# Patient Record
Sex: MALE | Race: WHITE
[De-identification: names, ages, dates, MRNs, and addresses within clinical notes are randomized per-mention and may not be internally consistent; named-entity substitution may affect disease eponyms.]

---

## 2018-08-24 ENCOUNTER — HOSPITAL ENCOUNTER (EMERGENCY)
Dept: HOSPITAL 56 - MW.ED | Age: 58
Discharge: HOME | End: 2018-08-24
Payer: COMMERCIAL

## 2018-08-24 DIAGNOSIS — S89.92XA: Primary | ICD-10-CM
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
## 2018-08-24 NOTE — CR
EXAM DATE: 18



PATIENT'S AGE: 58



Patient: PATRICE WESTBROOK



Facility: [place], [place]

Patient ID: [id number]

Site Patient ID: [id number].

Site Accession #: [id number].

: 1960

Study: XRay Knee Left [id number]/[id number] 6:19:58 PM

Ordering Physician: Doctor Temp



Final Report: 

Indication:

Injury 



Technique:

Three views of the left knee



Comparison:

None available



Findings:

Bones: A small curvilinear calcific density adjacent to the medial tibial spine 
on the frontal view, of unclear chronicity and possibly projectional. No 
dislocation. 

Joint spaces: Preserved. Small patellar osteophytes. A possible small 
suprapatellar effusion. Possible mild edema in Hoffa`s fat pad. 

Soft tissues: Unremarkable. 



Impression:

A small calcific density adjacent to the medial tibial spine may be 
projectional or chronic, however a small acute avulsion fracture is not 
excluded. Correlate clinically and if indicated, consider followup imaging 
evaluation.



Dictated by Aurelio Freed MD @ 2018 6:56:00 PM



Dictated by: Aurelio Freed MD @ 2018 18:56:14

(Electronic Signature)



Report Signed by Proxy.
KHADRA

## 2018-08-24 NOTE — EDM.PDOC
ED HPI GENERAL MEDICAL PROBLEM





- General


Chief Complaint: Lower Extremity Injury/Pain


Stated Complaint: PT HURT LT KNEE


Time Seen by Provider: 08/24/18 18:00


Source of Information: Reports: Patient


History Limitations: Reports: No Limitations





- History of Present Illness


INITIAL COMMENTS - FREE TEXT/NARRATIVE: 





HISTORY AND PHYSICAL:





History of present illness:


Patient is a 58-year-old male here with complaint of left knee pain. States 

that around noon today he was standing and when to go away with someone and 

moved to the left. He states when he did this he felt a pop on the inside of 

his left knee. He states his knee did not give out on him but he felt immediate 

pain. He has been able to walk on it just with some discomfort. He has not 

taken anything for the pain.





Review of systems: 


As per history of present illness and below otherwise all systems reviewed and 

negative.





Past medical history: 


As per history of present illness and as reviewed below otherwise 

noncontributory.





Surgical history: 


As per history of present illness and as reviewed below otherwise 

noncontributory.





Social history: 


No reported history of drug or alcohol abuse.





Family history: 


As per history of present illness and as reviewed below otherwise 

noncontributory.





Physical exam:


General: Patient sitting comfortably in no acute distress and nontoxic appearing


HEENT: Atraumatic, normocephalic, pupils reactive, negative for conjunctival 

pallor or scleral icterus, mucous membranes moist, throat clear, neck supple, 

nontender, trachea midline.


Lungs: Clear to auscultation, breath sounds equal bilaterally, chest nontender.


Heart: S1S2, regular, negative for clicks, rubs, or JVD.


Abdomen: Soft, nondistended, nontender. Negative for masses or 

hepatosplenomegaly. Negative for costovertebral tenderness.


Pelvis: Stable nontender.


Genitourinary: Deferred.


Rectal: Deferred.


Extremities: There is no obvious swelling or deformity of the left knee. 

Patient has pain to palpation of the medial joint line. No effusion noted. 

Negative anterior, posterior drawer. Negative varus and valgus stress test. 

Atraumatic, negative for cords or calf pain. Neurovascular unremarkable.


Neuro: Awake, alert, oriented. Cranial nerves II through XII unremarkable. 

Cerebellum unremarkable. Motor and sensory unremarkable throughout. Exam 

nonfocal.





Notes: There is a small calcific density adjacent to the medial tibial spine 

that may suggest a small acute avulsion fracture. 





Diagnostics:


X-ray left knee





Therapeutics:


Knee immobilizer





Prescriptions


Diclofenac 





Impression: 


Left knee injury 





Plan:


1. Ice, elevate, and take diclofenac as directed. You may take tylenol if 

needed but no additional NSAIDs. 


2. Follow up with orthopedic provider


3. Return to ED as needed as discussed 





Definitive disposition and diagnosis as appropriate pending reevaluation and 

review of above.





  ** Left Knee


Pain Score (Numeric/FACES): 7





- Related Data


 Allergies











Allergy/AdvReac Type Severity Reaction Status Date / Time


 


No Known Allergies Allergy   Verified 04/04/14 16:55











Home Meds: 


 Home Meds





Aspirin [Shana Chewable] 81 mg PO DAILY 04/04/14 [History]


Calcium Carbonate [Tums] mg PO PRN 04/04/14 [History]


Diclofenac Sodium [Voltaren] 75 mg PO BIDMEALS #20 tab.cr 08/24/18 [Rx]











Past Medical History





- Past Health History


Medical/Surgical History: Denies Medical/Surgical History





- Infectious Disease History


Infectious Disease History: Reports: None





- Past Surgical History


GI Surgical History: Reports: Hernia, Inguinal





Social & Family History





- Family History


Family Medical History: Noncontributory





- Tobacco Use


Smoking Status *Q: Never Smoker


Second Hand Smoke Exposure: No





- Recreational Drug Use


Recreational Drug Use: No





Review of Systems





- Review of Systems


Review Of Systems: ROS reveals no pertinent complaints other than HPI.





ED EXAM, GENERAL





- Physical Exam


Exam: See Below (see dictation)





Course





- Vital Signs


Last Recorded V/S: 


 Last Vital Signs











Temp  36.9 C   08/24/18 17:42


 


Pulse  74   08/24/18 17:42


 


Resp  18   08/24/18 17:42


 


BP  128/84   08/24/18 17:42


 


Pulse Ox  98   08/24/18 17:42














- Orders/Labs/Meds


Orders: 


 Active Orders 24 hr











 Category Date Time Status


 


 Knee 3V Lt [CR] Stat Exams  08/24/18 17:56 Taken














Departure





- Departure


Time of Disposition: 19:15


Disposition: Home, Self-Care 01


Condition: Good


Clinical Impression: 


 Left knee injury








- Discharge Information


Prescriptions: 


Diclofenac Sodium [Voltaren] 75 mg PO BIDMEALS #20 tab.cr


Referrals: 


PCP,None [Primary Care Provider] - 


Forms:  ED Department Discharge


Additional Instructions: 


The following information is given to patients seen in the emergency department 

who are being discharged to home. This information is to outline your options 

for follow-up care. We provide all patients seen in our emergency department 

with a follow-up referral.





The need for follow-up, as well as the timing and circumstances, are variable 

depending upon the specifics of your emergency department visit.





If you don't have a primary care physician on staff, we will provide you with a 

referral. We always advise you to contact your personal physician following an 

emergency department visit to inform them of the circumstance of the visit and 

for follow-up with them and/or the need for any referrals to a consulting 

specialist.





The emergency department will also refer you to a specialist when appropriate. 

This referral assures that you have the opportunity for follow-up care with a 

specialist. All of these measure are taken in an effort to provide you with 

optimal care, which includes your follow-up.





Under all circumstances we always encourage you to contact your private 

physician who remains a resource for coordinating your care. When calling for 

follow-up care, please make the office aware that this follow-up is from your 

recent emergency room visit. If for any reason you are refused follow-up, 

please contact the [facility] Emergency 

Department at [phone] and asked to speak to the emergency department 

charge nurse.





[facility]


Specialty Care - Orthopedic Clinic


[address], [address]


[place] [address]


Phone: [phone]


Fax: [phone]





1. Ice, elevate, and take diclofenac as directed. You may take tylenol if 

needed but no additional NSAIDs. 


2. Follow up with orthopedic provider


3. Return to ED as needed as discussed 





- My Orders


Last 24 Hours: 


My Active Orders





08/24/18 17:56


Knee 3V Lt [CR] Stat 














- Assessment/Plan


Last 24 Hours: 


My Active Orders





08/24/18 17:56


Knee 3V Lt [CR] Stat

## 2021-02-12 NOTE — EDM.PDOC
ED HPI GENERAL MEDICAL PROBLEM





- General


Chief Complaint: Skin Complaint


Stated Complaint: LEFT ELBOW PAIN


Time Seen by Provider: 02/12/21 20:34





- History of Present Illness


INITIAL COMMENTS - FREE TEXT/NARRATIVE: 


60-year-old male does not see doctors but no known known past medical history 

presenting with left elbow warmth and redness that started 1 day ago he works as

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  

No fevers no chills no pain in the wrist or shoulder.


  ** left elbow


Pain Score (Numeric/FACES): 5





- Related Data


                                    Allergies











Allergy/AdvReac Type Severity Reaction Status Date / Time


 


No Known Allergies Allergy   Verified 02/12/21 20:36











Home Meds: 


                                    Home Meds





Amoxicillin 875 mg PO BID 10 Days #20 tablet 02/12/21 [Rx]


Ibuprofen [Motrin] 600 mg PO Q8H 5 Days #15 tab 02/12/21 [Rx]


Sulfamethoxazole/Trimethoprim [Bactrim Ds Tablet] 1 each PO BID 10 Days #20 

tablet 02/12/21 [Rx]











Past Medical History





- Past Health History


Medical/Surgical History: Denies Medical/Surgical History


HEENT History: Reports: None


Cardiovascular History: Reports: None


Respiratory History: Reports: None


Gastrointestinal History: Reports: None


Genitourinary History: Reports: None


Musculoskeletal History: Reports: None


Neurological History: Reports: None


Psychiatric History: Reports: None


Endocrine/Metabolic History: Reports: None


Insulin Pump Model and : None


Hematologic History: Reports: None


Immunologic History: Reports: None


Oncologic (Cancer) History: Reports: None


Dermatologic History: Reports: None





- Infectious Disease History


Infectious Disease History: Reports: None





- Past Surgical History


GI Surgical History: Reports: Hernia, Inguinal





Social & Family History





- Family History


Family Medical History: No Pertinent Family History





- Caffeine Use


Caffeine Use: Reports: Coffee





- Recreational Drug Use


Recreational Drug Use: No





ED ROS GENERAL





- Review of Systems


Review Of Systems: See Below


Free Text/Narrative/Comment: 


General: No fever.


Skin: Per HPI


Musculoskeletal: Per HPI


Neurologic: No headache.





ED EXAM, SKIN/RASH


Exam: See Below


Text/Narrative:: 


General Appearance: No acute distress, appears comfortable


Skin: No rash


HEENT: Normocephalic/atraumatic, sclera anicteric, mucous membranes moist


Neck: Normal range of motion


Musculoskeletal: There is a tender warm and faintly erythematous swelling over 

the left olecranon bursa there is no severe overlying cellulitis there is no 

lymphangitic spread there is no limitation in range of motion of the left elbow 

the left hand is neurovascularly intact.  He has no tenderness within the elbow 

joint itself.  Strength is intact in the left upper extremity


Neurologic: Awake, alert, no obvious deficits, moving all extremities


Psychiatric: Appropriate, cooperative





Course





- Vital Signs


Last Recorded V/S: 


                                Last Vital Signs











Temp  97.7 F   02/12/21 20:36


 


Pulse  82   02/12/21 20:36


 


Resp  18   02/12/21 20:36


 


BP  154/91 H  02/12/21 20:36


 


Pulse Ox  97   02/12/21 20:36














- Orders/Labs/Meds


Meds: 


Medications














Discontinued Medications














Generic Name Dose Route Start Last Admin





  Trade Name Freq  PRN Reason Stop Dose Admin


 


Amoxicillin  1,000 mg  02/12/21 21:05 





  Amoxil  PO  02/12/21 21:06 





  ONETIME ONE  


 


Amoxicillin  1,000 mg  02/12/21 21:14 





  Amoxil  PO  02/12/21 21:15 





  ONETIME ONE  


 


Ibuprofen  600 mg  02/12/21 21:06  02/12/21 21:13





  Motrin  PO  02/12/21 21:07  600 mg





  ONETIME ONE   Administration


 


Trimethoprim/Sulfamethoxazole  1 tab  02/12/21 21:05  02/12/21 21:14





  Septra Ds  PO  02/12/21 21:06  1 tab





  ONETIME ONE   Administration














Departure





- Departure


Time of Disposition: 20:58


Disposition: Home, Self-Care 01


Condition: Good


Clinical Impression: 


 Olecranon bursitis, left elbow








- Discharge Information


*PRESCRIPTION DRUG MONITORING PROGRAM REVIEWED*: Not Applicable


*COPY OF PRESCRIPTION DRUG MONITORING REPORT IN PATIENT JI: Not Applicable


Prescriptions: 


Amoxicillin 875 mg PO BID 10 Days #20 tablet


Sulfamethoxazole/Trimethoprim [Bactrim Ds Tablet] 1 each PO BID 10 Days #20 

tablet


Ibuprofen [Motrin] 600 mg PO Q8H 5 Days #15 tab


Instructions:  Elbow Bursitis, Easy-to-Read


Referrals: 


PCP,None [Primary Care Provider] - 


Forms:  ED Department Discharge


Additional Instructions: 


Please take the ibuprofen as scheduled for the next 5 days.  Please be sure to 

take this with food.  You have been given a course of antibiotics as well in 

case this bursitis is due to an early infective process.  If you notice any 

worsening redness worsening swelling or worsening pain please return to the ER. 

Occasionally bursitis needs to be drained with a needle.  However, I do not 

think that you were at that point at this time.





The following information is given to patients seen in the emergency department 

who are being discharged to home. This information is to outline your options 

for follow-up care. We provide all patients seen in our emergency department 

with a follow-up referral.





The need for follow-up, as well as the timing and circumstances, are variable 

depending upon the specifics of your emergency department visit.





If you don't have a primary care physician on staff, we will provide you with a 

referral. We always advise you to contact your personal physician following an 

emergency department visit to inform them of the circumstance of the visit and 

for follow-up with them and/or the need for any referrals to a consulting 

specialist.





The emergency department will also refer you to a specialist when appropriate. 

This referral assures that you have the opportunity for follow-up care with a 

specialist. All of these measure are taken in an effort to provide you with 

optimal care, which includes your follow-up.





Under all circumstances we always encourage you to contact your private 

physician who remains a resource for coordinating your care. When calling for 

follow-up care, please make the office aware that this follow-up is from your 

recent emergency room visit. If for any reason you are refused follow-up, please

contact the [facility] Emergency Department

at [phone] and asked to speak to the emergency department charge nurse.





Sepsis Event Note (ED)





- Evaluation


Sepsis Screening Result: No Definite Risk





- Focused Exam


Vital Signs: 


                                   Vital Signs











  Temp Pulse Resp BP Pulse Ox


 


 02/12/21 20:36  97.7 F  82  18  154/91 H  97














- Assessment/Plan


Assessment:: 


60-year-old male presenting with signs and symptoms most consistent with left 

olecranon bursitis.  Could be infectious or inflammatory.  I do not believe it 

requires aspiration at this time.  However I think given his age and unclear 

medical history a course of antibiotics would be prudent.  Patient will also be 

put on scheduled NSAIDs.  Strict return precautions were discussed and 

understood there is nothing that suggest septic arthritis nothing that suggest 

gout there is no findings of cellulitis and no clinical signs of systemic 

illness.